# Patient Record
(demographics unavailable — no encounter records)

---

## 2025-04-21 NOTE — ASSESSMENT
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  It is been present for 3 weeks is not changed in size.  We discussed potential etiologies as well as ganglion cyst in detail today.  We discussed that the only way to confirm the etiology of the soft tissue mass is for excisional biopsy with pathologic analysis.  We discussed warning signs of a worsening mass including pain that develops overlying skin changes, it rapidly enlarging or continue to grow or sensory changes as it is next the digital nerve. - We discussed further diagnostic options including obtaining an ultrasound to ensure that it is cystic in nature.  She did ask if this could be a Dupuytren's cord he does not have this appearance or texture. - She is unsure if she is going to obtain an ultrasound this time point, she is given a prescription for this.  We reviewed the results of this obtained.  We also discussed that it is cystic we can have an ultrasound-guided aspiration done at the time.  If she is continuing observe then she will follow-up if any worsening symptoms as above

## 2025-04-21 NOTE — HISTORY OF PRESENT ILLNESS
[FreeTextEntry1] : 39-year-old female presenting for evaluation left hand cyst/soft tissue mass.  She states has been there approximately 3 weeks since she first noticed it but it may have been present longer.  There is no history of injury or trauma.  Is not causing any significant pain.  Does not cause her discomfort.  Is not limiting her ADLs at this time point.  Denies numbness or tingling in the hand it is not grown in size since she first noticed it 3 weeks ago.  She is right-hand dominant.

## 2025-04-21 NOTE — PHYSICAL EXAM
[de-identified] : Left hand On the left hand palm/volar region there is a palpable soft tissue mass it is present on the radial border of the flexor tendon proximal to the MCP flexion crease of the small finger.  She is able to make a full composite fist.  The mass is well-circumscribed  - Mass does not move with flexion extension of the digit.  No overlying skin changes.  Is nontender to palpation. - Denies numbness or tingling in the digit distally - Full flexion extension of the small finger [de-identified] :  3 view xray obtained today of the left hand , including AP, lateral, and oblique. These were personally reviewed and demonstrate no bony abnormalities, no evidence of fracture or dislocation.  Soft tissue mass not evident

## 2025-05-15 NOTE — HISTORY OF PRESENT ILLNESS
[FreeTextEntry1] : 38 yo female hx of graves disease here today for annual visit. NFP for birth control. Get monthly menses. She denies any GYN complaints at this time.  No fam hx of colon CA PCP: Dr. Haney

## 2025-05-15 NOTE — PLAN
[FreeTextEntry1] : Patient to follow up in 1 year for annual GYN exam Mammogram due:40  Pap ordered All questions answered, patient agreeable with plan  I Danny Mejia [facility] am scribing for the presence of Dr. Chan the following sections HISTORY OF PRESENT ILLNESS, PAST MEDICAL/FAMILY/SOCIAL HISTORY; REVIEW OF SYSTEMS; VITAL SIGNS; PHYSICAL EXAM; DISPOSITION. I personally performed the services described in the documentation, reviewed the documentation recorded by the scribe in my presence and it accurately and completely records my words and actions.

## 2025-05-15 NOTE — PLAN
[FreeTextEntry1] : Patient to follow up in 1 year for annual GYN exam Mammogram due:40  Pap ordered All questions answered, patient agreeable with plan  I Danny Mejia [facility] am scribing for the presence of Dr. Chan the following sections HISTORY OF PRESENT ILLNESS, PAST MEDICAL/FAMILY/SOCIAL HISTORY; REVIEW OF SYSTEMS; VITAL SIGNS; PHYSICAL EXAM; DISPOSITION. I personally performed the services described in the documentation, reviewed the documentation recorded by the scribe in my presence and it accurately and completely records my words and actions.

## 2025-05-15 NOTE — HISTORY OF PRESENT ILLNESS
[FreeTextEntry1] : 40 yo female hx of graves disease here today for annual visit. NFP for birth control. Get monthly menses. She denies any GYN complaints at this time.  No fam hx of colon CA PCP: Dr. Haney